# Patient Record
Sex: FEMALE | Race: WHITE | Employment: FULL TIME | URBAN - METROPOLITAN AREA
[De-identification: names, ages, dates, MRNs, and addresses within clinical notes are randomized per-mention and may not be internally consistent; named-entity substitution may affect disease eponyms.]

---

## 2017-04-25 ENCOUNTER — HOSPITAL ENCOUNTER (OUTPATIENT)
Dept: OTHER | Age: 27
Discharge: HOME | End: 2017-04-25
Attending: OBSTETRICS & GYNECOLOGY | Admitting: OBSTETRICS & GYNECOLOGY

## 2018-05-10 PROBLEM — Z98.891 HISTORY OF C-SECTION: Status: ACTIVE | Noted: 2018-05-10

## 2019-04-01 ENCOUNTER — OFFICE VISIT (OUTPATIENT)
Dept: FAMILY MEDICINE CLINIC | Age: 29
End: 2019-04-01
Payer: COMMERCIAL

## 2019-04-01 VITALS
WEIGHT: 145.5 LBS | HEART RATE: 97 BPM | BODY MASS INDEX: 25.78 KG/M2 | RESPIRATION RATE: 20 BRPM | DIASTOLIC BLOOD PRESSURE: 66 MMHG | HEIGHT: 63 IN | SYSTOLIC BLOOD PRESSURE: 116 MMHG | OXYGEN SATURATION: 98 %

## 2019-04-01 DIAGNOSIS — Z00.00 PHYSICAL EXAM, ANNUAL: Primary | ICD-10-CM

## 2019-04-01 PROBLEM — Z98.891 HISTORY OF C-SECTION: Status: RESOLVED | Noted: 2018-05-10 | Resolved: 2019-04-01

## 2019-04-01 PROCEDURE — 99385 PREV VISIT NEW AGE 18-39: CPT | Performed by: FAMILY MEDICINE

## 2019-04-01 ASSESSMENT — PATIENT HEALTH QUESTIONNAIRE - PHQ9
SUM OF ALL RESPONSES TO PHQ QUESTIONS 1-9: 0
2. FEELING DOWN, DEPRESSED OR HOPELESS: 0
SUM OF ALL RESPONSES TO PHQ9 QUESTIONS 1 & 2: 0
1. LITTLE INTEREST OR PLEASURE IN DOING THINGS: 0
SUM OF ALL RESPONSES TO PHQ QUESTIONS 1-9: 0

## 2019-04-01 NOTE — PROGRESS NOTES
Chief Complaint: New Patient (New patient visit) and Discuss Labs (For Mono)       HPI: Is here to establish care. Denies any concerns. She recently gave birth. Her labs are within normal limits. Patient's problem list, medications, allergies, past medical, surgical, social and family histories were reviewed and updated as appropriate. Current Outpatient Medications   Medication Sig Dispense Refill    ibuprofen (ADVIL;MOTRIN) 800 MG tablet Take 1 tablet by mouth every 8 hours as needed for Pain 21 tablet 0    Norethindrone Acet-Ethinyl Est (MICROGESTIN 1.5/30 PO) Take 1 tablet by mouth daily. No current facility-administered medications for this visit. Social History     Tobacco Use    Smoking status: Never Smoker    Smokeless tobacco: Never Used   Substance Use Topics    Alcohol use: Yes     Comment: 8 Beer per month            Review of Systems:  Constitutional: Negative for appetite change, fatigue, fever and unexpected weight change. HENT: Negative for congestion, ear discharge, ear pain, hearing loss, postnasal drip, rhinorrhea, sinus pressure, sore throat and trouble swallowing. Eyes: Negative for photophobia, discharge, redness and visual disturbance. Respiratory: Negative for cough, chest tightness, shortness of breath and wheezing. Cardiovascular: Negative for chest pain, palpitations and leg swelling. Gastrointestinal: Negative for abdominal pain, blood in stool, constipation, diarrhea, nausea and vomiting. Endocrine: Negative for cold intolerance, heat intolerance and polyuria. Genitourinary: Negative for difficulty urinating, flank pain, frequency, hematuria and urgency. Musculoskeletal: Negative for gait problem, joint swelling and myalgias. Skin: Negative for color change, pallor, rash and wound. Allergic/Immunologic: Negative for environmental allergies and food allergies.    Neurological: Negative for dizziness, tremors, seizures, syncope, facial asymmetry, speech difficulty, weakness, light-headedness, numbness and headaches. Hematological: Negative. Psychiatric/Behavioral: Negative for agitation, confusion, self-injury, sleep disturbance and suicidal ideas. The patient is not nervous/anxious. Objective:     Vitals:    04/01/19 1414   BP: 116/66   Site: Left Upper Arm   Position: Sitting   Cuff Size: Medium Adult   Pulse: 97   Resp: 20   SpO2: 98%   Weight: 145 lb 8 oz (66 kg)   Height: 5' 3\" (1.6 m)     Body mass index is 25.77 kg/m². Wt Readings from Last 3 Encounters:   04/01/19 145 lb 8 oz (66 kg)   05/10/18 177 lb (80.3 kg)   12/13/16 171 lb (77.6 kg)     BP Readings from Last 3 Encounters:   04/01/19 116/66   05/12/18 116/84   12/15/16 125/89       Physical exam:  Constitutional: she is oriented to person, place, and time. she appears well-developed and well-nourished. No distress. HENT:   Head: Normocephalic. Right Ear: External ear normal. Normal TM   Left Ear: External ear normal. Normal TM  Nose: Nose normal.   Mouth/Throat: Oropharynx is clear and moist. No oropharyngeal exudate. Eyes: Conjunctivae and EOM are normal. Pupils are equal, round, and reactive to light. Right eye exhibits no discharge. Left eye exhibits no discharge. No scleral icterus. Neck: Normal range of motion. No JVD present. No tracheal deviation present. No thyromegaly present. Cardiovascular: Normal rate, regular rhythm, normal heart sounds and intact distal pulses. No murmur heard. Pulmonary/Chest: Effort normal and breath sounds normal. No stridor. No respiratory distress. she has no wheezes. she has no rales. sheexhibits no tenderness. Abdominal: Soft. Bowel sounds are normal. she exhibits no distension and no mass. There is no tenderness. There is no rebound and no guarding. Musculoskeletal: Normal range of motion. she exhibits no edema, tenderness or deformity. Lymphadenopathy:     she has no cervical adenopathy.    Neurological:she is alert and oriented to person, place, and time. she  has normal reflexes. No cranial nerve deficit. Coordination normal.   Skin: Skin is warm and dry. No rash noted. she is not diaphoretic. No erythema. No pallor. Psychiatric: she has a normal mood and affect. her   behavior is normal.         Health Maintenance   Topic Date Due    Varicella Vaccine (2 of 2 - 2-dose childhood series) 02/22/1996    Cervical cancer screen  11/21/2010    Flu vaccine (Season Ended) 09/01/2019    DTaP/Tdap/Td vaccine (9 - Td) 03/19/2028    HIV screen  Completed          Assessment/Plan:     1.  Physical exam, annual  Normal   Up to date with immunization       Michelle Grajeda  4/1/2019 2:42 PM

## 2019-04-30 ENCOUNTER — OFFICE VISIT (OUTPATIENT)
Dept: FAMILY MEDICINE CLINIC | Age: 29
End: 2019-04-30
Payer: COMMERCIAL

## 2019-04-30 VITALS
WEIGHT: 146 LBS | RESPIRATION RATE: 18 BRPM | SYSTOLIC BLOOD PRESSURE: 122 MMHG | BODY MASS INDEX: 25.87 KG/M2 | HEIGHT: 63 IN | HEART RATE: 68 BPM | OXYGEN SATURATION: 99 % | DIASTOLIC BLOOD PRESSURE: 88 MMHG

## 2019-04-30 DIAGNOSIS — K64.4 EXTERNAL HEMORRHOIDS WITH COMPLICATION: Primary | ICD-10-CM

## 2019-04-30 PROCEDURE — G8427 DOCREV CUR MEDS BY ELIG CLIN: HCPCS | Performed by: FAMILY MEDICINE

## 2019-04-30 PROCEDURE — 1036F TOBACCO NON-USER: CPT | Performed by: FAMILY MEDICINE

## 2019-04-30 PROCEDURE — G8419 CALC BMI OUT NRM PARAM NOF/U: HCPCS | Performed by: FAMILY MEDICINE

## 2019-04-30 PROCEDURE — 99213 OFFICE O/P EST LOW 20 MIN: CPT | Performed by: FAMILY MEDICINE

## 2019-04-30 NOTE — PROGRESS NOTES
Chief Complaint: Rectal Problems (Prolapse)       HPI:  Heather Mcmahan is a 33 y.o. female here with c/o mass coming out of her anus. She denies any history of constipation started noticing this since 4 days and it's been very uncomfortable. Denies any bleeding or pain. But it's bothering her to sit. Denies history of difficult labor. ROS:  Constitutional: Negative for appetite change, fatigue, fever and unexpected weight change. Respiratory: Negative for cough, chest tightness, shortness of breath and wheezing. Cardiovascular: Negative for chest pain, palpitations and leg swelling. Gastrointestinal: Negative for abdominal pain, blood in stool, constipation, diarrhea, nausea and vomiting. Patient's problem list, medications, allergies, past medical, surgical, social and family histories were reviewed and updated as appropriate. Current Outpatient Medications   Medication Sig Dispense Refill    Norethindrone Acet-Ethinyl Est (MICROGESTIN 1.5/30 PO) Take 1 tablet by mouth daily. No current facility-administered medications for this visit. Social History     Tobacco Use    Smoking status: Never Smoker    Smokeless tobacco: Never Used   Substance Use Topics    Alcohol use: Yes     Comment: 8 Beer per month        Objective:     Vitals:    04/30/19 1209   BP: 122/88   Site: Left Upper Arm   Position: Sitting   Cuff Size: Medium Adult   Pulse: 68   Resp: 18   SpO2: 99%   Weight: 146 lb (66.2 kg)   Height: 5' 3\" (1.6 m)     Body mass index is 25.86 kg/m². Wt Readings from Last 3 Encounters:   04/30/19 146 lb (66.2 kg)   04/01/19 145 lb 8 oz (66 kg)   05/10/18 177 lb (80.3 kg)     BP Readings from Last 3 Encounters:   04/30/19 122/88   04/01/19 116/66   05/12/18 116/84       Physical exam:  Constitutional: she is oriented to person, place, and time. she appears well-developed and well-nourished. No distress.    Cardiovascular: Normal rate, regular rhythm, normal heart sounds and

## 2019-05-06 ENCOUNTER — OFFICE VISIT (OUTPATIENT)
Dept: SURGERY | Age: 29
End: 2019-05-06
Payer: COMMERCIAL

## 2019-05-06 VITALS
BODY MASS INDEX: 26.5 KG/M2 | HEIGHT: 62 IN | WEIGHT: 144 LBS | SYSTOLIC BLOOD PRESSURE: 120 MMHG | DIASTOLIC BLOOD PRESSURE: 80 MMHG

## 2019-05-06 DIAGNOSIS — K64.9 HEMORRHOIDS, UNSPECIFIED HEMORRHOID TYPE: Primary | ICD-10-CM

## 2019-05-06 PROCEDURE — 99242 OFF/OP CONSLTJ NEW/EST SF 20: CPT | Performed by: SURGERY

## 2019-05-06 PROCEDURE — G8419 CALC BMI OUT NRM PARAM NOF/U: HCPCS | Performed by: SURGERY

## 2019-05-06 PROCEDURE — G8427 DOCREV CUR MEDS BY ELIG CLIN: HCPCS | Performed by: SURGERY

## 2019-05-06 ASSESSMENT — ENCOUNTER SYMPTOMS
RECTAL PAIN: 1
RESPIRATORY NEGATIVE: 1
EYES NEGATIVE: 1
ALLERGIC/IMMUNOLOGIC NEGATIVE: 1

## 2019-05-06 NOTE — PROGRESS NOTES
Subjective:     Mita Valencia is a 33 y.o. female     CC: Perianal pain    HPI: 50-year-old female who reports problems with hemorrhoids over the last 3 years. She presents now with an 8 day history of perianal pain. The pain was initially severe but has improved over the last few days. No history of bleeding, burning, itching or moisture. Her bowel movements are typically regular. [unfilled]    Prior to Visit Medications    Medication Sig Taking? Authorizing Provider   Norethindrone Acet-Ethinyl Est (MICROGESTIN 1.5/30 PO) Take 1 tablet by mouth daily.  Yes Historical Provider, MD       Social History     Socioeconomic History    Marital status:      Spouse name: Not on file    Number of children: Not on file    Years of education: Not on file    Highest education level: Not on file   Occupational History    Not on file   Social Needs    Financial resource strain: Not on file    Food insecurity:     Worry: Not on file     Inability: Not on file    Transportation needs:     Medical: Not on file     Non-medical: Not on file   Tobacco Use    Smoking status: Never Smoker    Smokeless tobacco: Never Used   Substance and Sexual Activity    Alcohol use: Yes     Comment: 8 Beer per month    Drug use: No    Sexual activity: Yes     Partners: Male   Lifestyle    Physical activity:     Days per week: Not on file     Minutes per session: Not on file    Stress: Not on file   Relationships    Social connections:     Talks on phone: Not on file     Gets together: Not on file     Attends Sikh service: Not on file     Active member of club or organization: Not on file     Attends meetings of clubs or organizations: Not on file     Relationship status: Not on file    Intimate partner violence:     Fear of current or ex partner: Not on file     Emotionally abused: Not on file     Physically abused: Not on file     Forced sexual activity: Not on file   Other Topics Concern    Not on file Social History Narrative    Not on file        Review of Systems   Constitutional: Negative. HENT: Negative. Eyes: Negative. Respiratory: Negative. Cardiovascular: Negative. Gastrointestinal: Positive for rectal pain. Endocrine: Negative. Genitourinary: Negative. Musculoskeletal: Negative. Skin: Negative. Allergic/Immunologic: Negative. Neurological: Negative. Hematological: Negative. Psychiatric/Behavioral: Negative. Objective:    Physical Exam   Constitutional: She is oriented to person, place, and time. She appears well-developed and well-nourished. HENT:   Head: Normocephalic and atraumatic. Right Ear: External ear normal.   Left Ear: External ear normal.   Eyes: Conjunctivae and EOM are normal.   Neck: Normal range of motion. Neck supple. Cardiovascular: Normal rate and regular rhythm. Pulmonary/Chest: Effort normal and breath sounds normal.   Musculoskeletal: Normal range of motion. She exhibits no edema. Neurological: She is alert and oriented to person, place, and time. Skin: Skin is warm and dry. Psychiatric: She has a normal mood and affect. Her behavior is normal.   Small resolving thrombosed external hemorrhoid left side posteriorly  Small external hemorrhoids located anteriorly  No evidence of a fistula    Assessment:      69-year-old female who reports problems with hemorrhoids over the last 3 years who presents now with an 8 day history of perianal pain. The pain was initially severe but has significantly improved over last few days. Her bowel movements have been normal and she denies blood with her stools. On physical examination she has mild external hemorrhoidal disease anteriorly. Towards the left side posteriorly, she has a small resolving thrombosed external hemorrhoid. There would be no benefit at this time to evacuating the hemorrhoid. Would also not recommend elective surgery for the small anteriorly located hemorrhoids. Plan:       Continue stool softeners during the acute phase of symptoms and then changed to fiber supplementation. Follow-up as needed.

## 2019-11-14 ENCOUNTER — OFFICE VISIT (OUTPATIENT)
Dept: FAMILY MEDICINE CLINIC | Age: 29
End: 2019-11-14
Payer: COMMERCIAL

## 2019-11-14 VITALS
RESPIRATION RATE: 16 BRPM | HEART RATE: 72 BPM | HEIGHT: 63 IN | SYSTOLIC BLOOD PRESSURE: 128 MMHG | BODY MASS INDEX: 24.91 KG/M2 | OXYGEN SATURATION: 98 % | DIASTOLIC BLOOD PRESSURE: 86 MMHG | WEIGHT: 140.6 LBS | TEMPERATURE: 97.8 F

## 2019-11-14 DIAGNOSIS — J06.9 VIRAL URI WITH COUGH: Primary | ICD-10-CM

## 2019-11-14 PROCEDURE — G8484 FLU IMMUNIZE NO ADMIN: HCPCS | Performed by: FAMILY MEDICINE

## 2019-11-14 PROCEDURE — G8427 DOCREV CUR MEDS BY ELIG CLIN: HCPCS | Performed by: FAMILY MEDICINE

## 2019-11-14 PROCEDURE — G8419 CALC BMI OUT NRM PARAM NOF/U: HCPCS | Performed by: FAMILY MEDICINE

## 2019-11-14 PROCEDURE — 1036F TOBACCO NON-USER: CPT | Performed by: FAMILY MEDICINE

## 2019-11-14 PROCEDURE — 99213 OFFICE O/P EST LOW 20 MIN: CPT | Performed by: FAMILY MEDICINE

## 2019-11-14 RX ORDER — PROMETHAZINE HYDROCHLORIDE AND CODEINE PHOSPHATE 6.25; 1 MG/5ML; MG/5ML
5 SYRUP ORAL NIGHTLY PRN
Qty: 118 ML | Refills: 0 | Status: SHIPPED | OUTPATIENT
Start: 2019-11-14 | End: 2019-11-17

## 2020-02-13 DIAGNOSIS — Z00.00 PHYSICAL EXAM, ANNUAL: ICD-10-CM

## 2020-02-13 LAB
A/G RATIO: 1.6 (ref 1.1–2.2)
ALBUMIN SERPL-MCNC: 4.5 G/DL (ref 3.4–5)
ALP BLD-CCNC: 79 U/L (ref 40–129)
ALT SERPL-CCNC: 22 U/L (ref 10–40)
ANION GAP SERPL CALCULATED.3IONS-SCNC: 11 MMOL/L (ref 3–16)
AST SERPL-CCNC: 30 U/L (ref 15–37)
BASOPHILS ABSOLUTE: 0 K/UL (ref 0–0.2)
BASOPHILS RELATIVE PERCENT: 0.4 %
BILIRUB SERPL-MCNC: 0.3 MG/DL (ref 0–1)
BUN BLDV-MCNC: 16 MG/DL (ref 7–20)
CALCIUM SERPL-MCNC: 8.8 MG/DL (ref 8.3–10.6)
CHLORIDE BLD-SCNC: 100 MMOL/L (ref 99–110)
CHOLESTEROL, TOTAL: 174 MG/DL (ref 0–199)
CO2: 24 MMOL/L (ref 21–32)
CREAT SERPL-MCNC: 0.6 MG/DL (ref 0.6–1.1)
EOSINOPHILS ABSOLUTE: 0.1 K/UL (ref 0–0.6)
EOSINOPHILS RELATIVE PERCENT: 1.4 %
GFR AFRICAN AMERICAN: >60
GFR NON-AFRICAN AMERICAN: >60
GLOBULIN: 2.9 G/DL
GLUCOSE BLD-MCNC: 74 MG/DL (ref 70–99)
HCT VFR BLD CALC: 40.2 % (ref 36–48)
HDLC SERPL-MCNC: 57 MG/DL (ref 40–60)
HEMOGLOBIN: 13.2 G/DL (ref 12–16)
LDL CHOLESTEROL CALCULATED: 107 MG/DL
LYMPHOCYTES ABSOLUTE: 2.3 K/UL (ref 1–5.1)
LYMPHOCYTES RELATIVE PERCENT: 45.8 %
MCH RBC QN AUTO: 28.1 PG (ref 26–34)
MCHC RBC AUTO-ENTMCNC: 32.9 G/DL (ref 31–36)
MCV RBC AUTO: 85.6 FL (ref 80–100)
MONOCYTES ABSOLUTE: 0.4 K/UL (ref 0–1.3)
MONOCYTES RELATIVE PERCENT: 7.9 %
NEUTROPHILS ABSOLUTE: 2.3 K/UL (ref 1.7–7.7)
NEUTROPHILS RELATIVE PERCENT: 44.5 %
PDW BLD-RTO: 15.4 % (ref 12.4–15.4)
PLATELET # BLD: 228 K/UL (ref 135–450)
PMV BLD AUTO: 10.6 FL (ref 5–10.5)
POTASSIUM SERPL-SCNC: 4.2 MMOL/L (ref 3.5–5.1)
RBC # BLD: 4.69 M/UL (ref 4–5.2)
SODIUM BLD-SCNC: 135 MMOL/L (ref 136–145)
TOTAL PROTEIN: 7.4 G/DL (ref 6.4–8.2)
TRIGL SERPL-MCNC: 48 MG/DL (ref 0–150)
TSH SERPL DL<=0.05 MIU/L-ACNC: 0.98 UIU/ML (ref 0.27–4.2)
VLDLC SERPL CALC-MCNC: 10 MG/DL
WBC # BLD: 5.1 K/UL (ref 4–11)

## 2020-02-21 ENCOUNTER — OFFICE VISIT (OUTPATIENT)
Dept: FAMILY MEDICINE CLINIC | Age: 30
End: 2020-02-21
Payer: COMMERCIAL

## 2020-02-21 VITALS
RESPIRATION RATE: 10 BRPM | BODY MASS INDEX: 24.1 KG/M2 | HEIGHT: 63 IN | OXYGEN SATURATION: 94 % | HEART RATE: 83 BPM | DIASTOLIC BLOOD PRESSURE: 68 MMHG | SYSTOLIC BLOOD PRESSURE: 102 MMHG | WEIGHT: 136 LBS

## 2020-02-21 PROCEDURE — G8482 FLU IMMUNIZE ORDER/ADMIN: HCPCS | Performed by: FAMILY MEDICINE

## 2020-02-21 PROCEDURE — G8420 CALC BMI NORM PARAMETERS: HCPCS | Performed by: FAMILY MEDICINE

## 2020-02-21 PROCEDURE — G8427 DOCREV CUR MEDS BY ELIG CLIN: HCPCS | Performed by: FAMILY MEDICINE

## 2020-02-21 PROCEDURE — 99395 PREV VISIT EST AGE 18-39: CPT | Performed by: FAMILY MEDICINE

## 2020-02-21 PROCEDURE — 1036F TOBACCO NON-USER: CPT | Performed by: FAMILY MEDICINE

## 2020-02-21 ASSESSMENT — PATIENT HEALTH QUESTIONNAIRE - PHQ9
SUM OF ALL RESPONSES TO PHQ QUESTIONS 1-9: 0
1. LITTLE INTEREST OR PLEASURE IN DOING THINGS: 0
SUM OF ALL RESPONSES TO PHQ QUESTIONS 1-9: 0
2. FEELING DOWN, DEPRESSED OR HOPELESS: 0
SUM OF ALL RESPONSES TO PHQ9 QUESTIONS 1 & 2: 0

## 2020-02-21 NOTE — PROGRESS NOTES
(61.7 kg)   Height: 5' 2.5\" (1.588 m)     Body mass index is 24.48 kg/m². Wt Readings from Last 3 Encounters:   02/21/20 136 lb (61.7 kg)   11/14/19 140 lb 9.6 oz (63.8 kg)   05/06/19 144 lb (65.3 kg)     BP Readings from Last 3 Encounters:   02/21/20 102/68   11/14/19 128/86   05/06/19 120/80       Physical exam:  Constitutional: she is oriented to person, place, and time. she appears well-developed and well-nourished. No distress. HENT:   Head: Normocephalic. Right Ear: External ear normal. Normal TM   Left Ear: External ear normal. Normal TM  Nose: Nose normal.   Mouth/Throat: Oropharynx is clear and moist. No oropharyngeal exudate. Eyes: Conjunctivae and EOM are normal. Pupils are equal, round, and reactive to light. Right eye exhibits no discharge. Left eye exhibits no discharge. No scleral icterus. Neck: Normal range of motion. No JVD present. No tracheal deviation present. No thyromegaly present. Cardiovascular: Normal rate, regular rhythm, normal heart sounds and intact distal pulses. No murmur heard. Pulmonary/Chest: Effort normal and breath sounds normal. No stridor. No respiratory distress. she has no wheezes. she has no rales. sheexhibits no tenderness. Abdominal: Soft. Bowel sounds are normal. she exhibits no distension and no mass. There is no tenderness. There is no rebound and no guarding. Musculoskeletal: Normal range of motion. she exhibits no edema, tenderness or deformity. Lymphadenopathy:     she has no cervical adenopathy. Neurological:she is alert and oriented to person, place, and time. she has gross neurological exam normal with normal strength and normal gait  Skin: Skin is warm and dry. No rash noted. she is not diaphoretic. No erythema. No pallor. Psychiatric: she has a normal mood and affect. her   behavior is normal.      Assessment/Plan:   1.  Annual physical exam  Normal physical exam  Normal labs and up to date with immunization         Laly CAMACHO

## 2020-07-14 ENCOUNTER — NURSE TRIAGE (OUTPATIENT)
Dept: OTHER | Facility: CLINIC | Age: 30
End: 2020-07-14

## 2020-07-14 NOTE — TELEPHONE ENCOUNTER
Reason for Disposition   [1] Caller concerned that exposure to COVID-19 occurred BUT [2] does not meet COVID-19 EXPOSURE criteria from CDC    Protocols used: CORONAVIRUS (JXGMV-50) EXPOSURE-ADULT-AH    Patient called pre-service center [facility] to schedule appointment, with red flag complaint, transferred to RN access for triage. Pt was around a person over the weekend that was exposed to someone who tested + for covid. This person has a test pending, but no results yet. The patient is not having symptoms. Reviewed definition of COVID exposure with pt per protocol. Pt has not had an exposure per definition. Triage indicates for pt to be cared for at home. Instructed pt to monitor for symptoms. She can isolate until friend's test result comes back if she'd like. Pt instructed to call back for any new or worsening symptoms. Patient verbalized understanding. Patient denies any other questions or concerns. Please do not respond to the triage nurse through this encounter. Any subsequent communication should be directly with the patient.

## 2021-04-19 ENCOUNTER — PATIENT MESSAGE (OUTPATIENT)
Dept: FAMILY MEDICINE CLINIC | Age: 31
End: 2021-04-19

## 2021-04-19 DIAGNOSIS — Z00.00 ANNUAL PHYSICAL EXAM: Primary | ICD-10-CM

## 2021-04-23 DIAGNOSIS — Z00.00 ANNUAL PHYSICAL EXAM: ICD-10-CM

## 2021-04-23 LAB
A/G RATIO: 1.8 (ref 1.1–2.2)
ALBUMIN SERPL-MCNC: 4.5 G/DL (ref 3.4–5)
ALP BLD-CCNC: 67 U/L (ref 40–129)
ALT SERPL-CCNC: 11 U/L (ref 10–40)
ANION GAP SERPL CALCULATED.3IONS-SCNC: 9 MMOL/L (ref 3–16)
AST SERPL-CCNC: 19 U/L (ref 15–37)
BASOPHILS ABSOLUTE: 0 K/UL (ref 0–0.2)
BASOPHILS RELATIVE PERCENT: 1.1 %
BILIRUB SERPL-MCNC: <0.2 MG/DL (ref 0–1)
BUN BLDV-MCNC: 11 MG/DL (ref 7–20)
CALCIUM SERPL-MCNC: 9.1 MG/DL (ref 8.3–10.6)
CHLORIDE BLD-SCNC: 112 MMOL/L (ref 99–110)
CHOLESTEROL, TOTAL: 149 MG/DL (ref 0–199)
CO2: 26 MMOL/L (ref 21–32)
CREAT SERPL-MCNC: 0.7 MG/DL (ref 0.6–1.1)
EOSINOPHILS ABSOLUTE: 0.1 K/UL (ref 0–0.6)
EOSINOPHILS RELATIVE PERCENT: 3.3 %
GFR AFRICAN AMERICAN: >60
GFR NON-AFRICAN AMERICAN: >60
GLOBULIN: 2.5 G/DL
GLUCOSE BLD-MCNC: 74 MG/DL (ref 70–99)
HCT VFR BLD CALC: 42.9 % (ref 36–48)
HDLC SERPL-MCNC: 61 MG/DL (ref 40–60)
HEMOGLOBIN: 14.2 G/DL (ref 12–16)
LDL CHOLESTEROL CALCULATED: 78 MG/DL
LYMPHOCYTES ABSOLUTE: 1.3 K/UL (ref 1–5.1)
LYMPHOCYTES RELATIVE PERCENT: 33.8 %
MCH RBC QN AUTO: 29.7 PG (ref 26–34)
MCHC RBC AUTO-ENTMCNC: 33.1 G/DL (ref 31–36)
MCV RBC AUTO: 89.9 FL (ref 80–100)
MONOCYTES ABSOLUTE: 0.4 K/UL (ref 0–1.3)
MONOCYTES RELATIVE PERCENT: 11.8 %
NEUTROPHILS ABSOLUTE: 1.9 K/UL (ref 1.7–7.7)
NEUTROPHILS RELATIVE PERCENT: 50 %
PDW BLD-RTO: 14.9 % (ref 12.4–15.4)
PLATELET # BLD: 197 K/UL (ref 135–450)
PMV BLD AUTO: 10.9 FL (ref 5–10.5)
POTASSIUM SERPL-SCNC: 4.6 MMOL/L (ref 3.5–5.1)
RBC # BLD: 4.78 M/UL (ref 4–5.2)
SODIUM BLD-SCNC: 147 MMOL/L (ref 136–145)
TOTAL PROTEIN: 7 G/DL (ref 6.4–8.2)
TRIGL SERPL-MCNC: 52 MG/DL (ref 0–150)
VLDLC SERPL CALC-MCNC: 10 MG/DL
WBC # BLD: 3.7 K/UL (ref 4–11)

## 2021-05-20 ENCOUNTER — OFFICE VISIT (OUTPATIENT)
Dept: FAMILY MEDICINE CLINIC | Age: 31
End: 2021-05-20
Payer: COMMERCIAL

## 2021-05-20 VITALS
HEART RATE: 82 BPM | BODY MASS INDEX: 23.57 KG/M2 | HEIGHT: 63 IN | WEIGHT: 133 LBS | OXYGEN SATURATION: 97 % | DIASTOLIC BLOOD PRESSURE: 82 MMHG | SYSTOLIC BLOOD PRESSURE: 112 MMHG | TEMPERATURE: 97.5 F

## 2021-05-20 DIAGNOSIS — Z00.00 PHYSICAL EXAM, ANNUAL: Primary | ICD-10-CM

## 2021-05-20 PROCEDURE — 99395 PREV VISIT EST AGE 18-39: CPT | Performed by: FAMILY MEDICINE

## 2021-05-20 SDOH — ECONOMIC STABILITY: FOOD INSECURITY: WITHIN THE PAST 12 MONTHS, YOU WORRIED THAT YOUR FOOD WOULD RUN OUT BEFORE YOU GOT MONEY TO BUY MORE.: NEVER TRUE

## 2021-05-20 ASSESSMENT — SOCIAL DETERMINANTS OF HEALTH (SDOH): HOW HARD IS IT FOR YOU TO PAY FOR THE VERY BASICS LIKE FOOD, HOUSING, MEDICAL CARE, AND HEATING?: NOT HARD AT ALL

## 2021-05-20 ASSESSMENT — PATIENT HEALTH QUESTIONNAIRE - PHQ9
SUM OF ALL RESPONSES TO PHQ QUESTIONS 1-9: 0
SUM OF ALL RESPONSES TO PHQ QUESTIONS 1-9: 0
2. FEELING DOWN, DEPRESSED OR HOPELESS: 0

## 2021-08-10 ENCOUNTER — TELEPHONE (OUTPATIENT)
Dept: FAMILY MEDICINE CLINIC | Age: 31
End: 2021-08-10

## 2021-08-10 ENCOUNTER — OFFICE VISIT (OUTPATIENT)
Dept: FAMILY MEDICINE CLINIC | Age: 31
End: 2021-08-10
Payer: COMMERCIAL

## 2021-08-10 VITALS
WEIGHT: 140 LBS | TEMPERATURE: 98 F | HEART RATE: 86 BPM | BODY MASS INDEX: 25.2 KG/M2 | SYSTOLIC BLOOD PRESSURE: 122 MMHG | DIASTOLIC BLOOD PRESSURE: 74 MMHG | OXYGEN SATURATION: 99 %

## 2021-08-10 DIAGNOSIS — R30.0 DYSURIA: Primary | ICD-10-CM

## 2021-08-10 LAB
BILIRUBIN, POC: NEGATIVE
BLOOD URINE, POC: NORMAL
CLARITY, POC: NORMAL
COLOR, POC: NORMAL
GLUCOSE URINE, POC: NEGATIVE
KETONES, POC: NEGATIVE
LEUKOCYTE EST, POC: NORMAL
NITRITE, POC: NEGATIVE
PH, POC: 7
PROTEIN, POC: NEGATIVE
SPECIFIC GRAVITY, POC: 1.01
UROBILINOGEN, POC: 0.2

## 2021-08-10 PROCEDURE — 99213 OFFICE O/P EST LOW 20 MIN: CPT | Performed by: FAMILY MEDICINE

## 2021-08-10 PROCEDURE — G8427 DOCREV CUR MEDS BY ELIG CLIN: HCPCS | Performed by: FAMILY MEDICINE

## 2021-08-10 PROCEDURE — G8419 CALC BMI OUT NRM PARAM NOF/U: HCPCS | Performed by: FAMILY MEDICINE

## 2021-08-10 PROCEDURE — 1036F TOBACCO NON-USER: CPT | Performed by: FAMILY MEDICINE

## 2021-08-10 PROCEDURE — 81002 URINALYSIS NONAUTO W/O SCOPE: CPT | Performed by: FAMILY MEDICINE

## 2021-08-10 RX ORDER — CIPROFLOXACIN 500 MG/1
500 TABLET, FILM COATED ORAL 2 TIMES DAILY
Qty: 6 TABLET | Refills: 0 | Status: SHIPPED | OUTPATIENT
Start: 2021-08-10 | End: 2021-08-13

## 2021-08-10 NOTE — PROGRESS NOTES
Chief Complaint: Urinary Tract Infection (c/o bladder feeling full, dysuria, and frequency since this am)       HPI:  Norma Heard is a 31 y.o. female here with c/o increased frequency of urine and dysuria happened this morning  She took azo and  felt better. And she started her menstrual cycle at the same time  Denies any suprapubic tenderness or nausea or vomiting    ROS:  Constitutional: Negative   HENT: Negative   Respiratory: Negative for cough, chest tightness, shortness of breath and wheezing. Cardiovascular: Negative for chest pain, palpitations and leg swelling. Gastrointestinal: Negative for abdominal pain, blood in stool, constipation, diarrhea, nausea and vomiting. Genitourinary: As mentioned above. Patient's problem list, medications, allergies, past medical, surgical, social and family histories were reviewed and updated as appropriate. Current Outpatient Medications   Medication Sig Dispense Refill    ciprofloxacin (CIPRO) 500 MG tablet Take 1 tablet by mouth 2 times daily for 3 days 6 tablet 0     No current facility-administered medications for this visit. Social History     Tobacco Use    Smoking status: Never Smoker    Smokeless tobacco: Never Used   Substance Use Topics    Alcohol use: Yes     Comment: 8 Beer per month        Objective:     Vitals:    08/10/21 1508   BP: 122/74   Site: Right Upper Arm   Position: Sitting   Cuff Size: Medium Adult   Pulse: 86   Temp: 98 °F (36.7 °C)   TempSrc: Infrared   SpO2: 99%   Weight: 140 lb (63.5 kg)     Body mass index is 25.2 kg/m². Wt Readings from Last 3 Encounters:   08/10/21 140 lb (63.5 kg)   05/20/21 133 lb (60.3 kg)   02/21/20 136 lb (61.7 kg)     BP Readings from Last 3 Encounters:   08/10/21 122/74   05/20/21 112/82   02/21/20 102/68       Physical exam:  Constitutional: she is oriented to person, place, and time. she appears well-developed and well-nourished. No distress.    Cardiovascular: Normal rate, regular rhythm, normal heart sounds and intact distal pulses. No murmur heard. Pulmonary/Chest: Effort normal and breath sounds normal. No stridor. No respiratory distress. she has no wheezes. she has no rales. sheexhibits no tenderness. Abdominal: Soft. Bowel sounds are normal. she exhibits no distension and no mass. There is no tenderness. There is no rebound and no guarding. Psychiatric: she has a normal mood and affect. her   behavior is normal.      Assessment/Plan:   1. Dysuria  - POCT Urinalysis no Micro  - ciprofloxacin (CIPRO) 500 MG tablet; Take 1 tablet by mouth 2 times daily for 3 days  Dispense: 6 tablet;  Refill: 0  - Culture, Urine           Garrick Hong MD  8/10/2021 3:33 PM

## 2021-08-10 NOTE — TELEPHONE ENCOUNTER
----- Message from Johann Thurman sent at 8/10/2021 10:42 AM EDT -----  Subject: Appointment Request    Reason for Call: Urgent Adult Urinary Problem    QUESTIONS  Type of Appointment? Established Patient  Reason for appointment request? No appointments available during search  Additional Information for Provider? Patient is calling to report urinary   symptoms, burning, frequency, bladder feels full. No appointments were   available Please advise patient what she should do. [facility]  ---------------------------------------------------------------------------  --------------  Doug Hinders INFO  What is the best way for the office to contact you? OK to leave message on   voicemail  Preferred Call Back Phone Number? [phone]  ---------------------------------------------------------------------------  --------------  SCRIPT ANSWERS  Relationship to Patient? Self  Are you having severe back pain with your urinary symptoms? No  Are you having vomiting or nausea? No  Is there blood in your urine? Yes  Have you been diagnosed with, awaiting test results for, or told that you   are suspected of having COVID-19 (Coronavirus)? (If patient has tested   negative or was tested as a requirement for work, school, or travel and   not based on symptoms, answer no)? No  Do you currently have flu-like symptoms including fever or chills, cough,   shortness of breath, difficulty breathing, or new loss of taste or smell? No  Have you had close contact with someone with COVID-19 in the last 14 days? No  (Service Expert  click yes below to proceed with [facility] As Usual   Scheduling)?  Yes

## 2021-08-11 LAB — URINE CULTURE, ROUTINE: NORMAL

## 2021-09-15 ENCOUNTER — OFFICE VISIT (OUTPATIENT)
Dept: FAMILY MEDICINE CLINIC | Age: 31
End: 2021-09-15
Payer: COMMERCIAL

## 2021-09-15 VITALS
HEIGHT: 63 IN | WEIGHT: 143 LBS | BODY MASS INDEX: 25.34 KG/M2 | DIASTOLIC BLOOD PRESSURE: 82 MMHG | HEART RATE: 80 BPM | SYSTOLIC BLOOD PRESSURE: 122 MMHG

## 2021-09-15 DIAGNOSIS — R30.0 DYSURIA: Primary | ICD-10-CM

## 2021-09-15 LAB
BILIRUBIN URINE: NEGATIVE
BILIRUBIN, POC: NORMAL
BLOOD URINE, POC: NORMAL
BLOOD, URINE: ABNORMAL
CLARITY, POC: NORMAL
CLARITY: ABNORMAL
COLOR, POC: NORMAL
COLOR: YELLOW
EPITHELIAL CELLS, UA: 1 /HPF (ref 0–5)
GLUCOSE URINE, POC: NORMAL
GLUCOSE URINE: NEGATIVE MG/DL
HYALINE CASTS: 8 /LPF (ref 0–8)
KETONES, POC: NORMAL
KETONES, URINE: NEGATIVE MG/DL
LEUKOCYTE EST, POC: NORMAL
LEUKOCYTE ESTERASE, URINE: ABNORMAL
MICROSCOPIC EXAMINATION: YES
NITRITE, POC: NORMAL
NITRITE, URINE: NEGATIVE
PH UA: 6.5 (ref 5–8)
PH, POC: 6.5
PROTEIN UA: NEGATIVE MG/DL
PROTEIN, POC: NORMAL
RBC UA: ABNORMAL /HPF (ref 0–4)
SPECIFIC GRAVITY UA: <1.005 (ref 1–1.03)
SPECIFIC GRAVITY, POC: 1.01
URINE TYPE: ABNORMAL
UROBILINOGEN, POC: 0.2
UROBILINOGEN, URINE: 0.2 E.U./DL
WBC UA: 80 /HPF (ref 0–5)

## 2021-09-15 PROCEDURE — 99213 OFFICE O/P EST LOW 20 MIN: CPT | Performed by: FAMILY MEDICINE

## 2021-09-15 PROCEDURE — 81002 URINALYSIS NONAUTO W/O SCOPE: CPT | Performed by: FAMILY MEDICINE

## 2021-09-15 ASSESSMENT — ENCOUNTER SYMPTOMS
VOMITING: 0
ABDOMINAL PAIN: 0
NAUSEA: 0

## 2021-09-15 NOTE — ASSESSMENT & PLAN NOTE
New, uncontrolled. POCT with +LE and blood. No s/s concerning for pyelo. Sx consistent with UTI, however, with previous hx of sterile pyuria, interstitial cystitis is also possible. Discussed abx vs waiting for UCx to result. Pt elects to wait for UCx results. Plan on macrobid 100mg bid x5days if positive.  Will call with results

## 2021-09-15 NOTE — PROGRESS NOTES
Lakhwinder Sexton (:  1990) is a 31 y.o. female with no past medical history presents with a chief complaint of:    Urinary Tract Infection      SUBJECTIVE:  HPI   33yoF w/ one day hx of dysuria, cloudy urine, frequency and blood in urine. She had similar sx one mos ago but had more frequency at that time. At that time, POCT UA in clinic was abnormal but urine culture didn't grow anything. She was prescribed cipro at last visit. She has allergies to sulfa and \"ceclor\". LMP on 6sep.  has vasectomy. No abdominal pain, f/c/n/v. No hx of kidney stones. Hx of 2 C/s deliveries. Review of Systems   Constitutional: Negative for chills and fever. Gastrointestinal: Negative for abdominal pain, nausea and vomiting. Genitourinary: Positive for dysuria, frequency, hematuria and urgency. OBJECTIVE:  /82 (Site: Right Upper Arm, Position: Sitting, Cuff Size: Medium Adult)   Pulse 80   Ht 5' 2.5\" (1.588 m)   Wt 143 lb (64.9 kg)   BMI 25.74 kg/m²    Physical Exam  Constitutional:       Appearance: Normal appearance. HENT:      Head: Normocephalic. Nose: Nose normal.      Mouth/Throat:      Mouth: Mucous membranes are moist.   Eyes:      Conjunctiva/sclera: Conjunctivae normal.   Cardiovascular:      Rate and Rhythm: Normal rate and regular rhythm. Pulses: Normal pulses. Heart sounds: Normal heart sounds. Pulmonary:      Effort: Pulmonary effort is normal.      Breath sounds: Normal breath sounds. Abdominal:      General: Abdomen is flat. There is no distension. Palpations: Abdomen is soft. Tenderness: There is no right CVA tenderness, left CVA tenderness or guarding. Comments: Mild suprapubic tenderness   Skin:     General: Skin is warm and dry. Neurological:      Mental Status: She is alert and oriented to person, place, and time. Psychiatric:         Behavior: Behavior normal.       ASSESSMENT/PLAN:  1. Dysuria  Assessment & Plan:  New, uncontrolled.  POCT with +LE and blood. No s/s concerning for pyelo. Sx consistent with UTI, however, with previous hx of sterile pyuria, interstitial cystitis is also possible. Discussed abx vs waiting for UCx to result. Pt elects to wait for UCx results. Plan on macrobid 100mg bid x5days if positive. Will call with results  Orders:  -     Culture, Urine  -     POCT Urinalysis no Micro  -     Urinalysis with Microscopic      Return if fevers, nausea or vomiting occur as these would be signs of kidney infection. Electronically signed by Brandon Lubin MD on 9/15/2021 at 2:39 PM.     Please note, portions of this note were completed with a voice recognition program.  Although every effort was made to ensure the accuracy of this automated transcription, some errors in transcription may have occurred.

## 2021-09-16 ENCOUNTER — TELEPHONE (OUTPATIENT)
Dept: FAMILY MEDICINE CLINIC | Age: 31
End: 2021-09-16

## 2021-09-16 DIAGNOSIS — R30.0 DYSURIA: ICD-10-CM

## 2021-09-16 DIAGNOSIS — R31.0 GROSS HEMATURIA: Primary | ICD-10-CM

## 2021-09-16 LAB — URINE CULTURE, ROUTINE: NORMAL

## 2021-09-16 NOTE — TELEPHONE ENCOUNTER
Called pt to discuss UA w/ micro. UCX pending. Her frequency has slightly improved but she still has pain with urination. No other sx that are different from yesterday. No bacteria on the UA. Large blood with 5-10 RBCs on the micro. Patient reports she had tomato soup 2 days in a row and drinks pop and coffee. Discussed options of waiting for urine culture versus empirically treating for UTI. Patient states that since she was treated last time and it did not actually have bacteria on the urine culture she would prefer to wait. We will call if urine culture is positive and treat with Macrobid as previously noted. If urine culture is negative plan to repeat the UA with micro in 4 to 6 weeks. Stressed to the patient to not provide a urine sample when she is on her menses. I will order it now and follow-up with her when I get the result. Patient will monitor her symptoms in the setting of what she eats and drinks in case it is interstitial cystitis. If large blood is still present on the UA will obtain a BMP to eval renal function and refer as indicated. Pt agrees with this plan. Called patient back at 1500 today. She did not answer. HIPAA form indicates it was okay to leave a message so I left a message with the results: Urine culture is negative after 24 hours. Not consistent with UTI. Given the large blood on UA will obtain lab work to look at renal function. If abnormal, I will refer her to nephrology. If normal I will refer her to urology for further work up of gross hematuria. Patient called back. Plan discussed as above.

## 2022-05-19 ENCOUNTER — OFFICE VISIT (OUTPATIENT)
Dept: FAMILY MEDICINE CLINIC | Age: 32
End: 2022-05-19
Payer: COMMERCIAL

## 2022-05-19 VITALS
HEART RATE: 81 BPM | HEIGHT: 62 IN | BODY MASS INDEX: 26.68 KG/M2 | OXYGEN SATURATION: 99 % | SYSTOLIC BLOOD PRESSURE: 122 MMHG | WEIGHT: 145 LBS | RESPIRATION RATE: 16 BRPM | DIASTOLIC BLOOD PRESSURE: 82 MMHG | TEMPERATURE: 98.2 F

## 2022-05-19 DIAGNOSIS — R05.8 POST-VIRAL COUGH SYNDROME: Primary | ICD-10-CM

## 2022-05-19 PROCEDURE — 99213 OFFICE O/P EST LOW 20 MIN: CPT | Performed by: FAMILY MEDICINE

## 2022-05-19 RX ORDER — BENZONATATE 200 MG/1
200 CAPSULE ORAL 3 TIMES DAILY PRN
Qty: 30 CAPSULE | Refills: 0 | Status: SHIPPED | OUTPATIENT
Start: 2022-05-19 | End: 2022-05-26

## 2022-05-19 NOTE — PROGRESS NOTES
Chief Complaint: Cough (productive cough w/green phlegm for 2 1/2 weeks; post COVID in January & believes she had flu (daughter test positive for flu in April))       HPI:  Romeo Mazariegos is a 27 y.o. female here with c/o ongoing cough since1 week. Prior to that she had sore throat. She got better without any antibiotics but the cough is lingering  Denies any shortness of breath  Denies any seasonal allergies. ROS:  Constitutional: Negative for appetite change, fatigue, fever and unexpected weight change. HENT: As mentioned above  Respiratory: As mentioned above. Cardiovascular: Negative for chest pain, palpitations and leg swelling. Gastrointestinal: Negative for abdominal pain, blood in stool, constipation, diarrhea, nausea and vomiting. Genitourinary: Negative    Patient's problem list, medications, allergies, past medical, surgical, social and family histories were reviewed and updated as appropriate. Current Outpatient Medications   Medication Sig Dispense Refill    benzonatate (TESSALON) 200 MG capsule Take 1 capsule by mouth 3 times daily as needed for Cough 30 capsule 0     No current facility-administered medications for this visit. Social History     Tobacco Use    Smoking status: Never Smoker    Smokeless tobacco: Never Used   Substance Use Topics    Alcohol use: Yes     Comment: 8 Beer per month        Objective:     Vitals:    05/19/22 0855   BP: 122/82   Site: Left Upper Arm   Position: Sitting   Cuff Size: Small Adult   Pulse: 81   Resp: 16   Temp: 98.2 °F (36.8 °C)   TempSrc: Temporal   SpO2: 99%   Weight: 145 lb (65.8 kg)   Height: 5' 2\" (1.575 m)     Body mass index is 26.52 kg/m².      Wt Readings from Last 3 Encounters:   05/19/22 145 lb (65.8 kg)   09/15/21 143 lb (64.9 kg)   08/10/21 140 lb (63.5 kg)     BP Readings from Last 3 Encounters:   05/19/22 122/82   09/15/21 122/82   08/10/21 122/74       Physical exam:  Constitutional: she is oriented to person, place, and time. she appears well-developed and well-nourished. No distress. HENT:   Head: Normocephalic. Right Ear: External ear normal. Normal TM   Left Ear: External ear normal. Normal TM  Nose: Nose normal.   Mouth/Throat: Oropharynx still continues to have few healed exudative spots on the oral pharynx  Eyes: Conjunctivae and EOM are normal. Pupils are equal, round, and reactive to light. Right eye exhibits no discharge. Left eye exhibits no discharge. No scleral icterus. Neck: Normal range of motion. No JVD present. No tracheal deviation present. No thyromegaly present. Cardiovascular: Normal rate, regular rhythm, normal heart sounds and intact distal pulses. No murmur heard. Pulmonary/Chest: Effort normal and breath sounds normal. No stridor. No respiratory distress. she has no wheezes. she has no rales. sheexhibits no tenderness. Abdominal: Soft. Bowel sounds are normal. she exhibits no distension and no mass. There is no tenderness. There is no rebound and no guarding. Assessment/Plan:   1. Post-viral cough syndrome  Advised on symptomatic treatment  Lungs were clear so will not get the cxr  And advised the antibiotics will not be beneficial   - benzonatate (TESSALON) 200 MG capsule; Take 1 capsule by mouth 3 times daily as needed for Cough  Dispense: 30 capsule;  Refill: 2700 Fernando Mackey MD  5/19/2022 1:07 PM

## 2022-10-18 ENCOUNTER — TELEMEDICINE (OUTPATIENT)
Dept: FAMILY MEDICINE CLINIC | Age: 32
End: 2022-10-18
Payer: COMMERCIAL

## 2022-10-18 DIAGNOSIS — J35.8 TONSILLITH: Primary | ICD-10-CM

## 2022-10-18 LAB
Lab: NORMAL
QC PASS/FAIL: NORMAL
S PYO AG THROAT QL: NORMAL
SARS-COV-2 RDRP RESP QL NAA+PROBE: NEGATIVE

## 2022-10-18 PROCEDURE — 87880 STREP A ASSAY W/OPTIC: CPT | Performed by: FAMILY MEDICINE

## 2022-10-18 PROCEDURE — 87635 SARS-COV-2 COVID-19 AMP PRB: CPT | Performed by: FAMILY MEDICINE

## 2022-10-18 PROCEDURE — 99213 OFFICE O/P EST LOW 20 MIN: CPT | Performed by: FAMILY MEDICINE

## 2022-10-18 ASSESSMENT — PATIENT HEALTH QUESTIONNAIRE - PHQ9
SUM OF ALL RESPONSES TO PHQ QUESTIONS 1-9: 0
2. FEELING DOWN, DEPRESSED OR HOPELESS: 0
1. LITTLE INTEREST OR PLEASURE IN DOING THINGS: 0
SUM OF ALL RESPONSES TO PHQ QUESTIONS 1-9: 0
SUM OF ALL RESPONSES TO PHQ QUESTIONS 1-9: 0
SUM OF ALL RESPONSES TO PHQ9 QUESTIONS 1 & 2: 0
SUM OF ALL RESPONSES TO PHQ QUESTIONS 1-9: 0

## 2022-10-18 NOTE — PROGRESS NOTES
TELEHEALTH EVALUATION -- Audio/Visual     Hector Herrera   YOB: 1990    Date of Visit:  10/18/2022    Allergies   Allergen Reactions    Cephalosporins Hives    Sulfa Antibiotics Hives     No current outpatient medications on file prior to visit. No current facility-administered medications on file prior to visit. Wt Readings from Last 3 Encounters:   22 145 lb (65.8 kg)   09/15/21 143 lb (64.9 kg)   08/10/21 140 lb (63.5 kg)     BP Readings from Last 3 Encounters:   22 122/82   09/15/21 122/82   08/10/21 122/74          Mira Jackmanlyn Neche (:  1990) has requested to and consented to an audio/video evaluation for the concerns or medical issues discussed below. Chief Complaint   Patient presents with    Congestion     [phone]    Pharyngitis     Tonsils both sore, and very white       HPI    Patient complains of white spots on her tonsils-  these spots are not going away though- gets stones but these spots don't seem to be going away like her stones do. Has spots on both sides and they looks like stones but are not going away like her typical stones. No cough. No sore throat. No pain. On first glance it looks like her typical stone but she can't get it out with a water pick or with applied pressure as she normally does to get them out. Has a very mild runny nose. No fever, no myalgias, feels normal.  Tonsils are more swollen then usual.  History of strep possibly earlier this year.      Social History     Socioeconomic History    Marital status:      Spouse name: Not on file    Number of children: Not on file    Years of education: Not on file    Highest education level: Not on file   Occupational History    Not on file   Tobacco Use    Smoking status: Never    Smokeless tobacco: Never   Vaping Use    Vaping Use: Not on file   Substance and Sexual Activity    Alcohol use: Yes     Comment: 8 Beer per month    Drug use: No    Sexual activity: Yes Partners: Male   Other Topics Concern    Not on file   Social History Narrative    Not on file     Social Determinants of Health     Financial Resource Strain: Not on file   Food Insecurity: Not on file   Transportation Needs: Not on file   Physical Activity: Not on file   Stress: Not on file   Social Connections: Not on file   Intimate Partner Violence: Not on file   Housing Stability: Not on file         Review of Systems  As documented in the HPI. Currently no complaints of CP or ASHLEY. Vital Signs: (As obtained by patient/caregiver or practitioner observation)    There were no vitals taken for this visit. Patient-Reported Vitals 10/18/2022   Patient-Reported Weight 143   Patient-Reported Height 5'2\"   Patient-Reported Pulse 72   Patient-Reported Temperature 97.2        Physical Exam  Constitutional:       General: She is not in acute distress. Appearance: Normal appearance. She is not ill-appearing. HENT:      Head: Normocephalic and atraumatic. Nose: Nose normal.   Pulmonary:      Effort: Pulmonary effort is normal. No respiratory distress. Neurological:      General: No focal deficit present. Mental Status: She is alert. Psychiatric:         Mood and Affect: Mood normal.         Behavior: Behavior normal.         Assessment/Plan     1. Tonsillith  Based on history and lack of other symptoms suspect tonsil stones. Advised to not try to get them out right now but to give it some time to see if they spontaneous resolve. Will come in for strep and covid swabs. If persists can come in for me to look at sometime. If develops other symptoms can let me know. Discussed ENT referral should she ever want tonsils removed for recurrent stones- she is not interested at this time. Return precautions reviewed. Discussed medications with patient, who voiced understanding of their use and indications. All questions answered.     Return for strep and covid test.           Oliver Nur, was evaluated through a synchronous (real-time) audio-video encounter. The patient (or guardian if applicable) is aware that this is a billable service, which includes applicable co-pays. This Virtual Visit was conducted with patient's (and/or legal guardian's) consent. The visit was conducted pursuant to the emergency declaration under the [address] [place] [address], [address] [place] [address] authority and the [employer] Act. Patient identification was verified, and a caregiver was present when appropriate. The patient was located at Home: [address] [place] [address]. Provider was located at Home ([address]: [place]. Patient identification was verified at the start of the visit: Yes    Total time spent on this encounter: Not billed by time. Services were provided through a video synchronous discussion virtually to substitute for in-person clinic visit. Documentation was done using voice recognition dragon software. Efforts were made to ensure accuracy; however, inadvertent, unintentional computerized transcription errors may be present. --Erin Angulo MD on 10/18/2022    An electronic signature was used to authenticate this note.

## 2022-10-19 LAB — SARS-COV-2: NOT DETECTED

## 2022-10-21 LAB
ORGANISM: ABNORMAL
THROAT CULTURE: ABNORMAL
THROAT CULTURE: ABNORMAL

## 2022-10-24 DIAGNOSIS — J35.8 TONSILLITH: Primary | ICD-10-CM

## 2022-10-24 RX ORDER — DOXYCYCLINE HYCLATE 100 MG/1
100 CAPSULE ORAL 2 TIMES DAILY
Qty: 14 CAPSULE | Refills: 0 | Status: SHIPPED | OUTPATIENT
Start: 2022-10-24 | End: 2022-10-31

## 2022-12-13 ENCOUNTER — TELEPHONE (OUTPATIENT)
Dept: FAMILY MEDICINE CLINIC | Age: 32
End: 2022-12-13

## 2022-12-13 ENCOUNTER — TELEMEDICINE (OUTPATIENT)
Dept: FAMILY MEDICINE CLINIC | Age: 32
End: 2022-12-13
Payer: COMMERCIAL

## 2022-12-13 DIAGNOSIS — Z11.52 ENCOUNTER FOR SCREENING FOR COVID-19: ICD-10-CM

## 2022-12-13 DIAGNOSIS — J01.90 ACUTE NON-RECURRENT SINUSITIS, UNSPECIFIED LOCATION: Primary | ICD-10-CM

## 2022-12-13 LAB
INFLUENZA A ANTIBODY: NORMAL
INFLUENZA B ANTIBODY: NORMAL
Lab: NORMAL
QC PASS/FAIL: NORMAL
S PYO AG THROAT QL: NORMAL
SARS-COV-2 RDRP RESP QL NAA+PROBE: NEGATIVE

## 2022-12-13 PROCEDURE — 99213 OFFICE O/P EST LOW 20 MIN: CPT | Performed by: FAMILY MEDICINE

## 2022-12-13 PROCEDURE — 87635 SARS-COV-2 COVID-19 AMP PRB: CPT | Performed by: FAMILY MEDICINE

## 2022-12-13 PROCEDURE — 87804 INFLUENZA ASSAY W/OPTIC: CPT | Performed by: FAMILY MEDICINE

## 2022-12-13 PROCEDURE — 87880 STREP A ASSAY W/OPTIC: CPT | Performed by: FAMILY MEDICINE

## 2022-12-13 RX ORDER — AZITHROMYCIN 250 MG/1
250 TABLET, FILM COATED ORAL DAILY
Qty: 6 TABLET | Refills: 0 | Status: SHIPPED | OUTPATIENT
Start: 2022-12-13 | End: 2022-12-18

## 2022-12-13 SDOH — ECONOMIC STABILITY: FOOD INSECURITY: WITHIN THE PAST 12 MONTHS, YOU WORRIED THAT YOUR FOOD WOULD RUN OUT BEFORE YOU GOT MONEY TO BUY MORE.: NEVER TRUE

## 2022-12-13 SDOH — ECONOMIC STABILITY: FOOD INSECURITY: WITHIN THE PAST 12 MONTHS, THE FOOD YOU BOUGHT JUST DIDN'T LAST AND YOU DIDN'T HAVE MONEY TO GET MORE.: NEVER TRUE

## 2022-12-13 ASSESSMENT — SOCIAL DETERMINANTS OF HEALTH (SDOH): HOW HARD IS IT FOR YOU TO PAY FOR THE VERY BASICS LIKE FOOD, HOUSING, MEDICAL CARE, AND HEATING?: NOT HARD AT ALL

## 2022-12-13 NOTE — PROGRESS NOTES
2022    TELEHEALTH EVALUATION -- Audio/Visual (During [id number]-[id number] public health emergency)    HPI:    Melyssa Dodge (:  1990) has requested an audio/video evaluation for the following concern(s): She has been having ongoing congestion and sore throat since a week. Her symptoms got worse again over the weekend. She has been having pressure in her sinuses and has been having headaches. Denies any fever. Denies any cough or shortness of breath. Review of Systems:  Gen:  Denies fever, chills  HEENT: As mentioned above  CV:  Denies chest pain or tightness, palpitations. Pulm:  Denies shortness of breath  Abd:  Denies abdominal pain, change in bowel habits. Prior to Visit Medications    Medication Sig Taking? Authorizing Provider   azithromycin (ZITHROMAX Z-TAY) 250 MG tablet Take 1 tablet by mouth daily for 5 days Take 2 tablets on the first day, followed by one tablet daily x 4 days. Yes Rajani Guillen MD       No past medical history on file. Past Surgical History:   Procedure Laterality Date     SECTION      2 c sections    DENTAL SURGERY      HAND SURGERY      Foreign Body    WISDOM TOOTH EXTRACTION  2013    Extraction of teeth #1, 12, 16, 28       Family History   Adopted: Yes       Allergies   Allergen Reactions    Cephalosporins Hives    Sulfa Antibiotics Hives       Social History     Tobacco Use    Smoking status: Never    Smokeless tobacco: Never   Substance Use Topics    Alcohol use: Yes     Comment: 8 Beer per month    Drug use: No          PHYSICAL EXAMINATION:  Vital Signs: (As obtained by patient/caregiver or practitioner observation)  There were no vitals taken for this visit. Patient-Reported Vitals 10/18/2022   Patient-Reported Weight 143   Patient-Reported Height 5'2\"   Patient-Reported Pulse 72   Patient-Reported Temperature 97.2        Respiratory rate appears normal      Constitutional: Appears well-developed and well-nourished.  No apparent distress Mental status: Alert and awake. Oriented to person/place/time. Able to follow commands    Eyes: EOM normal. Sclera normal. No discharge visible  HENT: Normocephalic, atraumatic. Mouth/Throat: Mucous membranes are moist. External Ears Normal    Neck: No visualized mass   Pulmonary/Chest: Respiratory effort normal.  No visualized signs of difficulty breathing or respiratory distress        Musculoskeletal:  Normal range of motion of neck  Neurological:       No Facial Asymmetry (Cranial nerve 7 motor function) (limited exam to video visit). No gaze palsy       Skin:  No significant exanthematous lesions or discoloration noted on facial skin       Psychiatric: Normal Affect. No Hallucinations            ASSESSMENT/PLAN:  1. Acute non-recurrent sinusitis, unspecified location  - POCT rapid strep A  - POCT Influenza A/B  - azithromycin (ZITHROMAX Z-TAY) 250 MG tablet; Take 1 tablet by mouth daily for 5 days Take 2 tablets on the first day, followed by one tablet daily x 4 days. Dispense: 6 tablet; Refill: 0    2. Encounter for screening for COVID-19  - POCT COVID-19 Rapid, NAAT         Yennifer Melendez, was evaluated through a synchronous (real-time) audio-video encounter. The patient (or guardian if applicable) is aware that this is a billable service, which includes applicable co-pays. This Virtual Visit was conducted with patient's (and/or legal guardian's) consent. The visit was conducted pursuant to the emergency declaration under the [address] [place] [address] authority and the [employer] Act. Patient identification was verified, and a caregiver was present when appropriate. The patient was located in a state where the provider was licensed to provide care. Total time spent on this encounter: This encounter was not billed based on time.      Services were provided through a video synchronous discussion virtually to substitute for in-person clinic visit. Patient was located in their home. Provider was located in the office. --Femi Lino MD on 12/13/2022 at 6:54 PM    An electronic signature was used to authenticate this note. Jonh Abernathy

## 2022-12-13 NOTE — TELEPHONE ENCOUNTER
Patient called seeking an appointment today or testing to be done. She's experiencing cough, congestion, sore throat, and watery eyes and it has been going on for a little over a week. She's okay with a VV, however no appointments were available today with our providers or virtualists at time of search. If a visit cannot be done today, she'd like to get tested for covid, flu and strep. Please advise. Patient prefers call back.

## 2022-12-14 LAB — SARS-COV-2: NOT DETECTED

## 2023-11-22 ENCOUNTER — OFFICE VISIT (OUTPATIENT)
Dept: FAMILY MEDICINE CLINIC | Age: 33
End: 2023-11-22
Payer: COMMERCIAL

## 2023-11-22 VITALS
OXYGEN SATURATION: 99 % | HEIGHT: 62 IN | BODY MASS INDEX: 28.01 KG/M2 | DIASTOLIC BLOOD PRESSURE: 89 MMHG | TEMPERATURE: 97.5 F | HEART RATE: 78 BPM | WEIGHT: 152.2 LBS | SYSTOLIC BLOOD PRESSURE: 128 MMHG

## 2023-11-22 DIAGNOSIS — Z00.00 ANNUAL PHYSICAL EXAM: ICD-10-CM

## 2023-11-22 DIAGNOSIS — Z00.00 ANNUAL PHYSICAL EXAM: Primary | ICD-10-CM

## 2023-11-22 DIAGNOSIS — R39.9 URINARY TRACT INFECTION SYMPTOMS: ICD-10-CM

## 2023-11-22 LAB
ANION GAP SERPL CALCULATED.3IONS-SCNC: 10 MMOL/L (ref 3–16)
BASOPHILS # BLD: 0 K/UL (ref 0–0.2)
BASOPHILS NFR BLD: 0.3 %
BILIRUBIN, POC: NEGATIVE
BLOOD URINE, POC: NEGATIVE
BUN SERPL-MCNC: 8 MG/DL (ref 7–20)
CALCIUM SERPL-MCNC: 9.3 MG/DL (ref 8.3–10.6)
CHLORIDE SERPL-SCNC: 104 MMOL/L (ref 99–110)
CHOLEST SERPL-MCNC: 180 MG/DL (ref 0–199)
CLARITY, POC: ABNORMAL
CO2 SERPL-SCNC: 25 MMOL/L (ref 21–32)
COLOR, POC: ABNORMAL
CREAT SERPL-MCNC: 0.6 MG/DL (ref 0.6–1.1)
DEPRECATED RDW RBC AUTO: 13.9 % (ref 12.4–15.4)
EOSINOPHIL # BLD: 0.1 K/UL (ref 0–0.6)
EOSINOPHIL NFR BLD: 1.1 %
GFR SERPLBLD CREATININE-BSD FMLA CKD-EPI: >60 ML/MIN/{1.73_M2}
GLUCOSE SERPL-MCNC: 73 MG/DL (ref 70–99)
GLUCOSE URINE, POC: NEGATIVE
HCT VFR BLD AUTO: 41.8 % (ref 36–48)
HDLC SERPL-MCNC: 70 MG/DL (ref 40–60)
HGB BLD-MCNC: 14 G/DL (ref 12–16)
KETONES, POC: NEGATIVE
LDLC SERPL CALC-MCNC: 102 MG/DL
LEUKOCYTE EST, POC: ABNORMAL
LYMPHOCYTES # BLD: 1.4 K/UL (ref 1–5.1)
LYMPHOCYTES NFR BLD: 24.2 %
MCH RBC QN AUTO: 30 PG (ref 26–34)
MCHC RBC AUTO-ENTMCNC: 33.5 G/DL (ref 31–36)
MCV RBC AUTO: 89.7 FL (ref 80–100)
MONOCYTES # BLD: 0.3 K/UL (ref 0–1.3)
MONOCYTES NFR BLD: 5.2 %
NEUTROPHILS # BLD: 3.9 K/UL (ref 1.7–7.7)
NEUTROPHILS NFR BLD: 69.2 %
NITRITE, POC: NEGATIVE
PH, POC: 7
PLATELET # BLD AUTO: 211 K/UL (ref 135–450)
PMV BLD AUTO: 10.6 FL (ref 5–10.5)
POTASSIUM SERPL-SCNC: 4.5 MMOL/L (ref 3.5–5.1)
PROTEIN, POC: NEGATIVE
RBC # BLD AUTO: 4.66 M/UL (ref 4–5.2)
SODIUM SERPL-SCNC: 139 MMOL/L (ref 136–145)
SPECIFIC GRAVITY, POC: 1.01
TRIGL SERPL-MCNC: 38 MG/DL (ref 0–150)
UROBILINOGEN, POC: 0.2
VLDLC SERPL CALC-MCNC: 8 MG/DL
WBC # BLD AUTO: 5.7 K/UL (ref 4–11)

## 2023-11-22 PROCEDURE — 90471 IMMUNIZATION ADMIN: CPT | Performed by: FAMILY MEDICINE

## 2023-11-22 PROCEDURE — 99395 PREV VISIT EST AGE 18-39: CPT | Performed by: FAMILY MEDICINE

## 2023-11-22 PROCEDURE — 81002 URINALYSIS NONAUTO W/O SCOPE: CPT | Performed by: FAMILY MEDICINE

## 2023-11-22 PROCEDURE — 90674 CCIIV4 VAC NO PRSV 0.5 ML IM: CPT | Performed by: FAMILY MEDICINE

## 2023-11-22 RX ORDER — CIPROFLOXACIN 500 MG/1
500 TABLET, FILM COATED ORAL 2 TIMES DAILY
Qty: 10 TABLET | Refills: 0 | Status: SHIPPED | OUTPATIENT
Start: 2023-11-22 | End: 2023-11-27

## 2023-11-22 SDOH — ECONOMIC STABILITY: INCOME INSECURITY: HOW HARD IS IT FOR YOU TO PAY FOR THE VERY BASICS LIKE FOOD, HOUSING, MEDICAL CARE, AND HEATING?: NOT HARD AT ALL

## 2023-11-22 SDOH — ECONOMIC STABILITY: FOOD INSECURITY: WITHIN THE PAST 12 MONTHS, YOU WORRIED THAT YOUR FOOD WOULD RUN OUT BEFORE YOU GOT MONEY TO BUY MORE.: NEVER TRUE

## 2023-11-22 SDOH — ECONOMIC STABILITY: FOOD INSECURITY: WITHIN THE PAST 12 MONTHS, THE FOOD YOU BOUGHT JUST DIDN'T LAST AND YOU DIDN'T HAVE MONEY TO GET MORE.: NEVER TRUE

## 2023-11-22 SDOH — ECONOMIC STABILITY: HOUSING INSECURITY
IN THE LAST 12 MONTHS, WAS THERE A TIME WHEN YOU DID NOT HAVE A STEADY PLACE TO SLEEP OR SLEPT IN A SHELTER (INCLUDING NOW)?: NO

## 2023-11-22 ASSESSMENT — PATIENT HEALTH QUESTIONNAIRE - PHQ9
1. LITTLE INTEREST OR PLEASURE IN DOING THINGS: 0
SUM OF ALL RESPONSES TO PHQ QUESTIONS 1-9: 0
SUM OF ALL RESPONSES TO PHQ9 QUESTIONS 1 & 2: 0
SUM OF ALL RESPONSES TO PHQ QUESTIONS 1-9: 0
2. FEELING DOWN, DEPRESSED OR HOPELESS: 0
SUM OF ALL RESPONSES TO PHQ QUESTIONS 1-9: 0
SUM OF ALL RESPONSES TO PHQ QUESTIONS 1-9: 0

## 2023-11-25 LAB
BACTERIA UR CULT: ABNORMAL
ORGANISM: ABNORMAL

## 2024-11-19 ENCOUNTER — OFFICE VISIT (OUTPATIENT)
Dept: FAMILY MEDICINE CLINIC | Age: 34
End: 2024-11-19

## 2024-11-19 VITALS
HEIGHT: 62 IN | SYSTOLIC BLOOD PRESSURE: 118 MMHG | OXYGEN SATURATION: 99 % | BODY MASS INDEX: 28.16 KG/M2 | HEART RATE: 79 BPM | TEMPERATURE: 97.6 F | WEIGHT: 153 LBS | DIASTOLIC BLOOD PRESSURE: 90 MMHG

## 2024-11-19 DIAGNOSIS — N30.01 ACUTE CYSTITIS WITH HEMATURIA: Primary | ICD-10-CM

## 2024-11-19 LAB
BILIRUBIN, POC: NEGATIVE
BLOOD URINE, POC: NORMAL
CLARITY, POC: CLEAR
COLOR, POC: YELLOW
GLUCOSE URINE, POC: NEGATIVE MG/DL
KETONES, POC: NEGATIVE MG/DL
LEUKOCYTE EST, POC: NORMAL
NITRITE, POC: NEGATIVE
PH, POC: 6.5
PROTEIN, POC: NEGATIVE MG/DL
SPECIFIC GRAVITY, POC: 1.01
UROBILINOGEN, POC: 0.2 MG/DL

## 2024-11-19 RX ORDER — NITROFURANTOIN 25; 75 MG/1; MG/1
100 CAPSULE ORAL 2 TIMES DAILY
Qty: 10 CAPSULE | Refills: 0 | Status: SHIPPED | OUTPATIENT
Start: 2024-11-19 | End: 2024-11-24

## 2024-11-19 ASSESSMENT — PATIENT HEALTH QUESTIONNAIRE - PHQ9
SUM OF ALL RESPONSES TO PHQ QUESTIONS 1-9: 0
SUM OF ALL RESPONSES TO PHQ QUESTIONS 1-9: 0
2. FEELING DOWN, DEPRESSED OR HOPELESS: NOT AT ALL
SUM OF ALL RESPONSES TO PHQ QUESTIONS 1-9: 0
SUM OF ALL RESPONSES TO PHQ QUESTIONS 1-9: 0

## 2024-11-19 NOTE — PROGRESS NOTES
Chief complaint: possible uti (Burning, urgency, frequency)      SUBJECTIVE:  HPI  Mira Gonzalez (:  1990) is a 33 y.o. female who presents with a chief complaint of: burning, urgency that started 3 hours ago.  No nausea or chills. Bouts of hot but no fever  No hx of kidney stone;  has vasectomy, they are monogamous. Last sexual encounter 2 nights ago. Consistently occurs after sexual intercourse; urinates after sex, cleans efore and after. This happened with last UTI in ; Culture positive in  Ecoli res to amp and tmp/smx  Period ended Saturday    Patient Active Problem List   Diagnosis    Dysuria    Tonsillith     History reviewed. No pertinent past medical history.  No current outpatient medications on file prior to visit.     No current facility-administered medications on file prior to visit.       OBJECTIVE:  BP (!) 118/90   Pulse 79   Temp 97.6 °F (36.4 °C) (Oral)   Ht 1.575 m (5' 2\")   Wt 69.4 kg (153 lb)   SpO2 99%   BMI 27.98 kg/m²      Physical exam:  afebrile, vitals reviewed  Gen:  WD, WN, NAD, A&Ox3, pleasant  Eyes:  Sclerae clear  Neck:  Supple, No cervical or submandibular LAD. No obvious thyromegaly.  Heart:  RRR, no murmur, rubs, gallops  Lungs:  CTAB, no W/R/R  Abd:  soft, NT/ND. No CVAT or flank pain bilaterally   Skin: No obvious rashes    ASSESSMENT/PLAN:  1. Acute cystitis with hematuria  New. POC UA +Leuk and blood. Neg nitrates. Will send for culture. Treat empirically with macrobid. Will call with culture results if resistant to macrobid.  Call in 2-3 days if symptoms not improving on antibiotics and will change antibiotics  If culture + UTI, will give macrobid to use for postcoital ppx  Pt agrees  -     POCT Urinalysis no Micro  -     Culture, Urine  -     nitrofurantoin, macrocrystal-monohydrate, (MACROBID) 100 MG capsule; Take 1 capsule by mouth 2 times daily for 5 days, Disp-10 capsule, R-0Normal    Electronically signed by Leia Rubin MD on

## 2024-11-21 LAB — BACTERIA UR CULT: NORMAL

## 2024-12-03 ENCOUNTER — OFFICE VISIT (OUTPATIENT)
Dept: FAMILY MEDICINE CLINIC | Age: 34
End: 2024-12-03
Payer: COMMERCIAL

## 2024-12-03 VITALS
SYSTOLIC BLOOD PRESSURE: 135 MMHG | BODY MASS INDEX: 28.52 KG/M2 | TEMPERATURE: 98.1 F | HEIGHT: 62 IN | OXYGEN SATURATION: 97 % | HEART RATE: 77 BPM | WEIGHT: 155 LBS | DIASTOLIC BLOOD PRESSURE: 86 MMHG

## 2024-12-03 DIAGNOSIS — Z00.00 ANNUAL PHYSICAL EXAM: Primary | ICD-10-CM

## 2024-12-03 PROCEDURE — 90661 CCIIV3 VAC ABX FR 0.5 ML IM: CPT | Performed by: FAMILY MEDICINE

## 2024-12-03 PROCEDURE — 91320 SARSCV2 VAC 30MCG TRS-SUC IM: CPT | Performed by: FAMILY MEDICINE

## 2024-12-03 PROCEDURE — 90471 IMMUNIZATION ADMIN: CPT | Performed by: FAMILY MEDICINE

## 2024-12-03 PROCEDURE — 90480 ADMN SARSCOV2 VAC 1/ONLY CMP: CPT | Performed by: FAMILY MEDICINE

## 2024-12-03 PROCEDURE — 99395 PREV VISIT EST AGE 18-39: CPT | Performed by: FAMILY MEDICINE

## 2024-12-03 SDOH — ECONOMIC STABILITY: FOOD INSECURITY: WITHIN THE PAST 12 MONTHS, YOU WORRIED THAT YOUR FOOD WOULD RUN OUT BEFORE YOU GOT MONEY TO BUY MORE.: NEVER TRUE

## 2024-12-03 SDOH — ECONOMIC STABILITY: INCOME INSECURITY: HOW HARD IS IT FOR YOU TO PAY FOR THE VERY BASICS LIKE FOOD, HOUSING, MEDICAL CARE, AND HEATING?: NOT HARD AT ALL

## 2024-12-03 SDOH — ECONOMIC STABILITY: FOOD INSECURITY: WITHIN THE PAST 12 MONTHS, THE FOOD YOU BOUGHT JUST DIDN'T LAST AND YOU DIDN'T HAVE MONEY TO GET MORE.: NEVER TRUE

## 2024-12-04 NOTE — PROGRESS NOTES
Chief Complaint: Annual Exam       HPI: She is here for annual physical.  Denies any concerns today.  She is due for flu and COVID shot.    She also being more forgetful in the recent past.  However denies any previous concerns of ADHD.  No increased stress.  As above cardiovascular disease.  No known family history.    Patient's problem list, medications, allergies, past medical, surgical, social and family histories were reviewed and updated as appropriate.     No current outpatient medications on file.     No current facility-administered medications for this visit.       Social History     Tobacco Use    Smoking status: Never    Smokeless tobacco: Never   Substance Use Topics    Alcohol use: Yes     Alcohol/week: 1.0 standard drink of alcohol     Types: 1 Cans of beer per week     Comment: 8 Beer per month            Review of Systems:  Constitutional: Negative for appetite change, fatigue, fever and unexpected weight change.   HENT: Negative  Respiratory: Negative for cough, chest tightness, shortness of breath and wheezing.    Cardiovascular: Negative for chest pain, palpitations and leg swelling.   Gastrointestinal: Negative   Genitourinary: Negative for difficulty urinating, flank pain, frequency, hematuria and urgency.   Musculoskeletal: Negative for gait problem, joint swelling and myalgias.   Skin: Negative   Neurological: Negative   Psychiatric/Behavioral: Negative        Objective:     Vitals:    12/03/24 1134   BP: 135/86   Pulse: 77   Temp: 98.1 °F (36.7 °C)   TempSrc: Temporal   SpO2: 97%   Weight: 70.3 kg (155 lb)   Height: 1.575 m (5' 2\")     Body mass index is 28.35 kg/m².     Wt Readings from Last 3 Encounters:   12/03/24 70.3 kg (155 lb)   11/19/24 69.4 kg (153 lb)   11/22/23 69 kg (152 lb 3.2 oz)     BP Readings from Last 3 Encounters:   12/03/24 135/86   11/19/24 (!) 118/90   11/22/23 128/89       Physical exam:  Constitutional: she is oriented to person, place, and time.she appears

## 2024-12-13 DIAGNOSIS — Z00.00 ANNUAL PHYSICAL EXAM: ICD-10-CM

## 2024-12-14 LAB
ANION GAP SERPL CALCULATED.3IONS-SCNC: 14 MMOL/L (ref 3–16)
BASOPHILS # BLD: 0 K/UL (ref 0–0.2)
BASOPHILS NFR BLD: 1 %
BUN SERPL-MCNC: 12 MG/DL (ref 7–20)
CALCIUM SERPL-MCNC: 8.7 MG/DL (ref 8.3–10.6)
CHLORIDE SERPL-SCNC: 101 MMOL/L (ref 99–110)
CHOLEST SERPL-MCNC: 196 MG/DL (ref 0–199)
CO2 SERPL-SCNC: 25 MMOL/L (ref 21–32)
CREAT SERPL-MCNC: 0.8 MG/DL (ref 0.6–1.1)
DEPRECATED RDW RBC AUTO: 16.5 % (ref 12.4–15.4)
EOSINOPHIL # BLD: 0.1 K/UL (ref 0–0.6)
EOSINOPHIL NFR BLD: 2.8 %
GFR SERPLBLD CREATININE-BSD FMLA CKD-EPI: >90 ML/MIN/{1.73_M2}
GLUCOSE SERPL-MCNC: 68 MG/DL (ref 70–99)
HCT VFR BLD AUTO: 37.8 % (ref 36–48)
HDLC SERPL-MCNC: 83 MG/DL (ref 40–60)
HGB BLD-MCNC: 12.3 G/DL (ref 12–16)
LDLC SERPL CALC-MCNC: 105 MG/DL
LYMPHOCYTES # BLD: 1.4 K/UL (ref 1–5.1)
LYMPHOCYTES NFR BLD: 30.8 %
MCH RBC QN AUTO: 28.9 PG (ref 26–34)
MCHC RBC AUTO-ENTMCNC: 32.7 G/DL (ref 31–36)
MCV RBC AUTO: 88.4 FL (ref 80–100)
MONOCYTES # BLD: 0.3 K/UL (ref 0–1.3)
MONOCYTES NFR BLD: 6.1 %
NEUTROPHILS # BLD: 2.7 K/UL (ref 1.7–7.7)
NEUTROPHILS NFR BLD: 59.3 %
PLATELET # BLD AUTO: 279 K/UL (ref 135–450)
PMV BLD AUTO: 10 FL (ref 5–10.5)
POTASSIUM SERPL-SCNC: 4.2 MMOL/L (ref 3.5–5.1)
RBC # BLD AUTO: 4.28 M/UL (ref 4–5.2)
SODIUM SERPL-SCNC: 140 MMOL/L (ref 136–145)
TRIGL SERPL-MCNC: 40 MG/DL (ref 0–150)
VLDLC SERPL CALC-MCNC: 8 MG/DL
WBC # BLD AUTO: 4.5 K/UL (ref 4–11)